# Patient Record
Sex: FEMALE | Race: WHITE
[De-identification: names, ages, dates, MRNs, and addresses within clinical notes are randomized per-mention and may not be internally consistent; named-entity substitution may affect disease eponyms.]

---

## 2019-01-01 ENCOUNTER — RESULT REVIEW (OUTPATIENT)
Age: 59
End: 2019-01-01

## 2019-01-02 ENCOUNTER — APPOINTMENT (OUTPATIENT)
Dept: OBGYN | Facility: CLINIC | Age: 59
End: 2019-01-02
Payer: COMMERCIAL

## 2019-01-02 PROCEDURE — 99396 PREV VISIT EST AGE 40-64: CPT

## 2020-02-11 ENCOUNTER — FORM ENCOUNTER (OUTPATIENT)
Age: 60
End: 2020-02-11

## 2020-02-12 ENCOUNTER — APPOINTMENT (OUTPATIENT)
Dept: OBGYN | Facility: CLINIC | Age: 60
End: 2020-02-12
Payer: COMMERCIAL

## 2020-02-12 PROCEDURE — 99396 PREV VISIT EST AGE 40-64: CPT

## 2021-01-20 ENCOUNTER — FORM ENCOUNTER (OUTPATIENT)
Age: 61
End: 2021-01-20

## 2021-02-22 ENCOUNTER — APPOINTMENT (OUTPATIENT)
Dept: OBGYN | Facility: CLINIC | Age: 61
End: 2021-02-22

## 2021-03-03 ENCOUNTER — FORM ENCOUNTER (OUTPATIENT)
Age: 61
End: 2021-03-03

## 2021-04-05 ENCOUNTER — APPOINTMENT (OUTPATIENT)
Dept: OBGYN | Facility: CLINIC | Age: 61
End: 2021-04-05

## 2023-01-25 ENCOUNTER — APPOINTMENT (OUTPATIENT)
Dept: OBGYN | Facility: CLINIC | Age: 63
End: 2023-01-25
Payer: COMMERCIAL

## 2023-01-25 VITALS
WEIGHT: 183 LBS | SYSTOLIC BLOOD PRESSURE: 132 MMHG | HEIGHT: 65.5 IN | DIASTOLIC BLOOD PRESSURE: 80 MMHG | BODY MASS INDEX: 30.12 KG/M2

## 2023-01-25 DIAGNOSIS — Z01.419 ENCOUNTER FOR GYNECOLOGICAL EXAMINATION (GENERAL) (ROUTINE) W/OUT ABNORMAL FINDINGS: ICD-10-CM

## 2023-01-25 PROCEDURE — 99396 PREV VISIT EST AGE 40-64: CPT

## 2023-01-25 NOTE — PLAN
[FreeTextEntry1] : 63 yo  LMP 2013, annual exam. \par \par HCM\par -pap done today\par -vit D/exercise/BMD f/u pcp for osteo screening\par -rx given for breast mammo/sono (nyu langone)\par -colon screening reviewed, cologuard utd. \par -f/u PCP for annual and appropriate immunizations\par -rto 1 year for annual. \par \par

## 2023-01-25 NOTE — END OF VISIT
[FreeTextEntry3] : I, Arin Renner, acted as a scribe on behalf of Dr. Cecilia Stanford on 01/25/2023. \par \par All medical entries made by the scribe where at my, Dr. Cecilia Stanford, direction and personally dictated by me on 01/25/2023. I have reviewed the chart and agree that the record accurately reflects my personal performance of the history, physical exam, assessment, and plan. I have also personally directed, reviewed and agreed with the chart.\par

## 2023-01-25 NOTE — HISTORY OF PRESENT ILLNESS
[FreeTextEntry1] : 63 yo  LMP , presents for annual exam. She requests a rx for mammo. Pt would like to lose weight, is looking for a new diet. She reports that she had Cologuard done 2 years ago. Pt no longer follows with breast surgeon. \par \par OBHx: c/s x2\par PMHx: breast CA  s/p lumpectomy, tamoxifen x5 years (finished oct 2011) \par PSHx: rt lumpectomy \par NKDA\par  [Mammogramdate] : 3/2021 [TextBox_19] : BIRADS 2 [BreastSonogramDate] : 3/2021 [TextBox_25] : BIRADS 2

## 2023-01-26 LAB — HPV HIGH+LOW RISK DNA PNL CVX: NOT DETECTED

## 2023-01-30 LAB — CYTOLOGY CVX/VAG DOC THIN PREP: NORMAL

## 2023-02-20 ENCOUNTER — OFFICE (OUTPATIENT)
Dept: URBAN - METROPOLITAN AREA CLINIC 12 | Facility: CLINIC | Age: 63
Setting detail: OPHTHALMOLOGY
End: 2023-02-20
Payer: COMMERCIAL

## 2023-02-20 DIAGNOSIS — H25.13: ICD-10-CM

## 2023-02-20 DIAGNOSIS — H53.002: ICD-10-CM

## 2023-02-20 PROCEDURE — 99204 OFFICE O/P NEW MOD 45 MIN: CPT | Performed by: OPHTHALMOLOGY

## 2023-02-20 ASSESSMENT — REFRACTION_MANIFEST
OD_CYLINDER: -0.75
OS_CYLINDER: -1.25
OS_SPHERE: +4.00
OS_VA1: 20/40+1
OD_VA1: 20/25+3
OD_SPHERE: +1.50
OS_AXIS: 085
OD_AXIS: 090

## 2023-02-20 ASSESSMENT — REFRACTION_AUTOREFRACTION
OS_SPHERE: +4.00
OS_AXIS: 086
OD_CYLINDER: -0.75
OD_AXIS: 091
OS_CYLINDER: -1.25
OD_SPHERE: +1.50

## 2023-02-20 ASSESSMENT — REFRACTION_CURRENTRX
OS_AXIS: 074
OS_SPHERE: +3.50
OS_OVR_VA: 20/
OD_CYLINDER: -0.50
OS_CYLINDER: -1.00
OD_OVR_VA: 20/
OD_SPHERE: +1.25
OS_ADD: +2.25
OD_ADD: +2.25
OD_AXIS: 090
OD_VPRISM_DIRECTION: PROGS
OS_VPRISM_DIRECTION: PROGS

## 2023-02-20 ASSESSMENT — SPHEQUIV_DERIVED
OS_SPHEQUIV: 3.375
OD_SPHEQUIV: 1.125
OD_SPHEQUIV: 1.125
OS_SPHEQUIV: 3.375

## 2023-02-20 ASSESSMENT — TONOMETRY
OS_IOP_MMHG: 13
OD_IOP_MMHG: 13

## 2023-02-20 ASSESSMENT — VISUAL ACUITY
OS_BCVA: 20/20-2
OD_BCVA: 20/50+1

## 2023-02-20 ASSESSMENT — AXIALLENGTH_DERIVED
OD_AL: 23.34
OS_AL: 22.3464
OS_AL: 22.3464
OD_AL: 23.34

## 2023-02-20 ASSESSMENT — KERATOMETRY
OD_K2POWER_DIOPTERS: 43.25
OD_AXISANGLE_DEGREES: 046
OS_AXISANGLE_DEGREES: 147
OD_K1POWER_DIOPTERS: 42.75
OS_K1POWER_DIOPTERS: 43.00
OS_K2POWER_DIOPTERS: 44.00

## 2023-02-20 ASSESSMENT — CONFRONTATIONAL VISUAL FIELD TEST (CVF)
OD_FINDINGS: FULL
OS_FINDINGS: FULL

## 2023-05-10 ENCOUNTER — OFFICE (OUTPATIENT)
Dept: URBAN - METROPOLITAN AREA CLINIC 12 | Facility: CLINIC | Age: 63
Setting detail: OPHTHALMOLOGY
End: 2023-05-10
Payer: MEDICARE

## 2023-05-10 DIAGNOSIS — H25.12: ICD-10-CM

## 2023-05-10 DIAGNOSIS — H25.13: ICD-10-CM

## 2023-05-10 DIAGNOSIS — H53.002: ICD-10-CM

## 2023-05-10 PROCEDURE — 99213 OFFICE O/P EST LOW 20 MIN: CPT | Performed by: OPHTHALMOLOGY

## 2023-05-10 PROCEDURE — 92136 OPHTHALMIC BIOMETRY: CPT | Performed by: OPHTHALMOLOGY

## 2023-05-10 ASSESSMENT — REFRACTION_CURRENTRX
OS_VPRISM_DIRECTION: PROGS
OS_ADD: +2.25
OS_CYLINDER: -1.00
OS_OVR_VA: 20/
OS_AXIS: 074
OD_ADD: +2.25
OD_SPHERE: +1.25
OD_VPRISM_DIRECTION: PROGS
OD_OVR_VA: 20/
OD_AXIS: 090
OS_SPHERE: +3.50
OD_CYLINDER: -0.50

## 2023-05-10 ASSESSMENT — CONFRONTATIONAL VISUAL FIELD TEST (CVF)
OS_FINDINGS: FULL
OD_FINDINGS: FULL

## 2023-05-10 ASSESSMENT — SPHEQUIV_DERIVED
OS_SPHEQUIV: 3.75
OD_SPHEQUIV: 1.125
OD_SPHEQUIV: 1.125
OS_SPHEQUIV: 3.375

## 2023-05-10 ASSESSMENT — AXIALLENGTH_DERIVED
OS_AL: 22.3877
OD_AL: 23.385
OD_AL: 23.385
OS_AL: 22.2569

## 2023-05-10 ASSESSMENT — REFRACTION_AUTOREFRACTION
OS_SPHERE: +4.50
OS_AXIS: 085
OD_SPHERE: +1.50
OD_AXIS: 086
OD_CYLINDER: -0.75
OS_CYLINDER: -1.50

## 2023-05-10 ASSESSMENT — REFRACTION_MANIFEST
OD_VA1: 20/25+3
OD_SPHERE: +1.50
OS_CYLINDER: -1.25
OS_SPHERE: +4.00
OS_AXIS: 085
OS_VA1: 20/40+1
OD_CYLINDER: -0.75
OD_AXIS: 090

## 2023-05-10 ASSESSMENT — KERATOMETRY
OD_AXISANGLE_DEGREES: 038
OS_AXISANGLE_DEGREES: 162
OD_K1POWER_DIOPTERS: 42.75
OD_K2POWER_DIOPTERS: 43.00
OS_K2POWER_DIOPTERS: 44.00
OS_K1POWER_DIOPTERS: 42.75

## 2023-05-10 ASSESSMENT — VISUAL ACUITY
OD_BCVA: 20/40+2
OS_BCVA: 20/25

## 2023-05-10 ASSESSMENT — TONOMETRY
OS_IOP_MMHG: 15
OD_IOP_MMHG: 15

## 2023-05-23 ENCOUNTER — ASC (OUTPATIENT)
Dept: URBAN - METROPOLITAN AREA SURGERY 8 | Facility: SURGERY | Age: 63
Setting detail: OPHTHALMOLOGY
End: 2023-05-23
Payer: MEDICARE

## 2023-05-23 DIAGNOSIS — H52.212: ICD-10-CM

## 2023-05-23 DIAGNOSIS — H25.12: ICD-10-CM

## 2023-05-23 PROCEDURE — V2787 ASTIGMATISM-CORRECT FUNCTION: HCPCS | Performed by: OPHTHALMOLOGY

## 2023-05-23 PROCEDURE — A9270 NON-COVERED ITEM OR SERVICE: HCPCS | Performed by: OPHTHALMOLOGY

## 2023-05-23 PROCEDURE — 66984 XCAPSL CTRC RMVL W/O ECP: CPT | Performed by: OPHTHALMOLOGY

## 2023-05-23 PROCEDURE — FEMTO FEMTOSECOND LASER: Performed by: OPHTHALMOLOGY

## 2023-05-24 ENCOUNTER — RX ONLY (RX ONLY)
Age: 63
End: 2023-05-24

## 2023-05-24 ENCOUNTER — OFFICE (OUTPATIENT)
Dept: URBAN - METROPOLITAN AREA CLINIC 12 | Facility: CLINIC | Age: 63
Setting detail: OPHTHALMOLOGY
End: 2023-05-24
Payer: MEDICARE

## 2023-05-24 DIAGNOSIS — H25.11: ICD-10-CM

## 2023-05-24 PROCEDURE — 92136 OPHTHALMIC BIOMETRY: CPT | Performed by: OPHTHALMOLOGY

## 2023-05-24 ASSESSMENT — REFRACTION_CURRENTRX
OD_ADD: +2.25
OD_SPHERE: +1.25
OS_SPHERE: +3.50
OD_OVR_VA: 20/
OS_VPRISM_DIRECTION: PROGS
OD_AXIS: 090
OS_OVR_VA: 20/
OD_VPRISM_DIRECTION: PROGS
OD_CYLINDER: -0.50
OS_AXIS: 074
OS_ADD: +2.25
OS_CYLINDER: -1.00

## 2023-05-24 ASSESSMENT — AXIALLENGTH_DERIVED
OS_AL: 22.4291
OD_AL: 23.5267
OS_AL: 23.5378
OD_AL: 23.4302

## 2023-05-24 ASSESSMENT — SPHEQUIV_DERIVED
OS_SPHEQUIV: 0.375
OS_SPHEQUIV: 3.375
OD_SPHEQUIV: 1.125
OD_SPHEQUIV: 0.875

## 2023-05-24 ASSESSMENT — CONFRONTATIONAL VISUAL FIELD TEST (CVF)
OD_FINDINGS: FULL
OS_FINDINGS: FULL

## 2023-05-24 ASSESSMENT — KERATOMETRY
OD_K2POWER_DIOPTERS: 43.00
OD_AXISANGLE_DEGREES: 043
OS_K2POWER_DIOPTERS: 43.50
OS_K1POWER_DIOPTERS: 43.00
OD_K1POWER_DIOPTERS: 42.50
OS_AXISANGLE_DEGREES: 008

## 2023-05-24 ASSESSMENT — REFRACTION_AUTOREFRACTION
OS_AXIS: 131
OD_CYLINDER: -0.75
OD_SPHERE: +1.25
OD_AXIS: 087
OS_SPHERE: +0.50
OS_CYLINDER: -0.25

## 2023-05-24 ASSESSMENT — REFRACTION_MANIFEST
OD_VA1: 20/25+3
OD_CYLINDER: -0.75
OD_AXIS: 090
OS_SPHERE: +4.00
OS_CYLINDER: -1.25
OD_SPHERE: +1.50
OS_AXIS: 085
OS_VA1: 20/40+1

## 2023-05-24 ASSESSMENT — TONOMETRY
OD_IOP_MMHG: 18
OS_IOP_MMHG: 14

## 2023-05-24 ASSESSMENT — VISUAL ACUITY
OS_BCVA: 20/30-2
OD_BCVA: 20/30-2

## 2023-05-30 ENCOUNTER — ASC (OUTPATIENT)
Dept: URBAN - METROPOLITAN AREA SURGERY 8 | Facility: SURGERY | Age: 63
Setting detail: OPHTHALMOLOGY
End: 2023-05-30
Payer: MEDICARE

## 2023-05-30 DIAGNOSIS — H25.11: ICD-10-CM

## 2023-05-30 DIAGNOSIS — H52.211: ICD-10-CM

## 2023-05-30 PROCEDURE — FEMTO FEMTOSECOND LASER: Performed by: OPHTHALMOLOGY

## 2023-05-30 PROCEDURE — A9270 NON-COVERED ITEM OR SERVICE: HCPCS | Performed by: OPHTHALMOLOGY

## 2023-05-30 PROCEDURE — 66984 XCAPSL CTRC RMVL W/O ECP: CPT | Performed by: OPHTHALMOLOGY

## 2023-05-30 PROCEDURE — V2787 ASTIGMATISM-CORRECT FUNCTION: HCPCS | Performed by: OPHTHALMOLOGY

## 2023-05-31 ENCOUNTER — OFFICE (OUTPATIENT)
Dept: URBAN - METROPOLITAN AREA CLINIC 12 | Facility: CLINIC | Age: 63
Setting detail: OPHTHALMOLOGY
End: 2023-05-31
Payer: MEDICARE

## 2023-05-31 DIAGNOSIS — Z96.1: ICD-10-CM

## 2023-05-31 PROCEDURE — 99024 POSTOP FOLLOW-UP VISIT: CPT | Performed by: OPHTHALMOLOGY

## 2023-05-31 ASSESSMENT — REFRACTION_CURRENTRX
OS_SPHERE: +3.50
OD_AXIS: 090
OD_OVR_VA: 20/
OD_VPRISM_DIRECTION: PROGS
OD_ADD: +2.25
OS_AXIS: 074
OS_CYLINDER: -1.00
OS_ADD: +2.25
OS_OVR_VA: 20/
OD_SPHERE: +1.25
OS_VPRISM_DIRECTION: PROGS
OD_CYLINDER: -0.50

## 2023-05-31 ASSESSMENT — VISUAL ACUITY
OD_BCVA: 20/30-2
OS_BCVA: 20/50+2

## 2023-05-31 ASSESSMENT — REFRACTION_MANIFEST
OD_VA1: 20/25+3
OS_SPHERE: +4.00
OS_AXIS: 085
OD_AXIS: 090
OD_CYLINDER: -0.75
OS_VA1: 20/40+1
OD_SPHERE: +1.50
OS_CYLINDER: -1.25

## 2023-05-31 ASSESSMENT — REFRACTION_AUTOREFRACTION
OD_AXIS: 125
OS_AXIS: 080
OS_CYLINDER: -0.50
OD_SPHERE: PLANO
OS_SPHERE: +0.25
OD_CYLINDER: -0.50

## 2023-05-31 ASSESSMENT — TONOMETRY
OD_IOP_MMHG: 14
OS_IOP_MMHG: 12

## 2023-05-31 ASSESSMENT — SPHEQUIV_DERIVED
OS_SPHEQUIV: 3.375
OS_SPHEQUIV: 0
OD_SPHEQUIV: 1.125

## 2023-05-31 ASSESSMENT — CONFRONTATIONAL VISUAL FIELD TEST (CVF)
OS_FINDINGS: FULL
OD_FINDINGS: FULL

## 2023-06-07 ENCOUNTER — OFFICE (OUTPATIENT)
Dept: URBAN - METROPOLITAN AREA CLINIC 12 | Facility: CLINIC | Age: 63
Setting detail: OPHTHALMOLOGY
End: 2023-06-07
Payer: MEDICARE

## 2023-06-07 DIAGNOSIS — Z96.1: ICD-10-CM

## 2023-06-07 PROCEDURE — 99024 POSTOP FOLLOW-UP VISIT: CPT | Performed by: OPHTHALMOLOGY

## 2023-06-07 ASSESSMENT — REFRACTION_MANIFEST
OD_AXIS: 090
OS_VA1: 20/40+1
OD_VA1: 20/25+3
OS_CYLINDER: -1.25
OS_AXIS: 085
OD_SPHERE: +1.50
OD_CYLINDER: -0.75
OS_SPHERE: +4.00

## 2023-06-07 ASSESSMENT — REFRACTION_AUTOREFRACTION
OD_AXIS: 112
OD_CYLINDER: -0.75
OS_SPHERE: +0.50
OS_CYLINDER: -0.50
OD_SPHERE: +0.50
OS_AXIS: 060

## 2023-06-07 ASSESSMENT — REFRACTION_CURRENTRX
OD_SPHERE: +1.25
OS_ADD: +2.25
OD_AXIS: 090
OD_ADD: +2.25
OS_VPRISM_DIRECTION: PROGS
OD_CYLINDER: -0.50
OS_CYLINDER: -1.00
OS_OVR_VA: 20/
OS_AXIS: 074
OD_OVR_VA: 20/
OD_VPRISM_DIRECTION: PROGS
OS_SPHERE: +3.50

## 2023-06-07 ASSESSMENT — SPHEQUIV_DERIVED
OD_SPHEQUIV: 1.125
OS_SPHEQUIV: 0.25
OS_SPHEQUIV: 3.375
OD_SPHEQUIV: 0.125

## 2023-06-07 ASSESSMENT — CONFRONTATIONAL VISUAL FIELD TEST (CVF)
OS_FINDINGS: FULL
OD_FINDINGS: FULL

## 2023-06-07 ASSESSMENT — AXIALLENGTH_DERIVED
OD_AL: 23.5891
OS_AL: 22.3877
OD_AL: 23.2059
OS_AL: 23.5405

## 2023-06-07 ASSESSMENT — VISUAL ACUITY
OS_BCVA: 20/20-2
OD_BCVA: 20/25-1

## 2023-06-07 ASSESSMENT — KERATOMETRY
OS_K1POWER_DIOPTERS: 43.00
OS_AXISANGLE_DEGREES: 150
OS_K2POWER_DIOPTERS: 43.75
OD_K1POWER_DIOPTERS: 43.00
OD_K2POWER_DIOPTERS: 43.75
OD_AXISANGLE_DEGREES: 069

## 2023-06-07 ASSESSMENT — TONOMETRY
OS_IOP_MMHG: 13
OD_IOP_MMHG: 10

## 2023-06-28 ENCOUNTER — OFFICE (OUTPATIENT)
Dept: URBAN - METROPOLITAN AREA CLINIC 12 | Facility: CLINIC | Age: 63
Setting detail: OPHTHALMOLOGY
End: 2023-06-28
Payer: MEDICARE

## 2023-06-28 DIAGNOSIS — Z96.1: ICD-10-CM

## 2023-06-28 PROCEDURE — 99024 POSTOP FOLLOW-UP VISIT: CPT | Performed by: OPHTHALMOLOGY

## 2023-06-28 ASSESSMENT — REFRACTION_CURRENTRX
OS_VPRISM_DIRECTION: PROGS
OS_ADD: +2.25
OD_ADD: +2.25
OS_CYLINDER: -1.00
OS_OVR_VA: 20/
OD_AXIS: 090
OS_SPHERE: +3.50
OD_CYLINDER: -0.50
OS_AXIS: 074
OD_OVR_VA: 20/
OD_VPRISM_DIRECTION: PROGS
OD_SPHERE: +1.25

## 2023-06-28 ASSESSMENT — REFRACTION_AUTOREFRACTION
OS_AXIS: 177
OS_CYLINDER: -1.00
OD_SPHERE: PLANO
OD_AXIS: 107
OS_SPHERE: -0.50
OD_CYLINDER: -0.25

## 2023-06-28 ASSESSMENT — REFRACTION_MANIFEST
OD_SPHERE: +1.50
OD_CYLINDER: -0.75
OS_CYLINDER: -1.25
OS_VA1: 20/40+1
OS_SPHERE: +4.00
OD_VA1: 20/25+3
OS_AXIS: 085
OD_AXIS: 090

## 2023-06-28 ASSESSMENT — SPHEQUIV_DERIVED
OD_SPHEQUIV: 1.125
OS_SPHEQUIV: -1
OS_SPHEQUIV: 3.375

## 2023-06-28 ASSESSMENT — AXIALLENGTH_DERIVED
OD_AL: 23.2504
OS_AL: 24.0834
OS_AL: 22.4291

## 2023-06-28 ASSESSMENT — KERATOMETRY
OS_K1POWER_DIOPTERS: 42.75
OD_K1POWER_DIOPTERS: 43.00
OS_K2POWER_DIOPTERS: 43.75
OS_AXISANGLE_DEGREES: 147
OD_K2POWER_DIOPTERS: 43.50
OD_AXISANGLE_DEGREES: 54

## 2023-06-28 ASSESSMENT — TONOMETRY
OS_IOP_MMHG: 16
OD_IOP_MMHG: 15

## 2023-06-28 ASSESSMENT — CONFRONTATIONAL VISUAL FIELD TEST (CVF)
OD_FINDINGS: FULL
OS_FINDINGS: FULL

## 2023-06-28 ASSESSMENT — VISUAL ACUITY
OD_BCVA: 20/40
OS_BCVA: 20/20

## 2023-07-12 ENCOUNTER — RX ONLY (RX ONLY)
Age: 63
End: 2023-07-12

## 2023-07-12 ENCOUNTER — OFFICE (OUTPATIENT)
Dept: URBAN - METROPOLITAN AREA CLINIC 12 | Facility: CLINIC | Age: 63
Setting detail: OPHTHALMOLOGY
End: 2023-07-12
Payer: MEDICARE

## 2023-07-12 DIAGNOSIS — H26.493: ICD-10-CM

## 2023-07-12 DIAGNOSIS — H16.223: ICD-10-CM

## 2023-07-12 DIAGNOSIS — Z96.1: ICD-10-CM

## 2023-07-12 PROCEDURE — 99024 POSTOP FOLLOW-UP VISIT: CPT | Performed by: OPHTHALMOLOGY

## 2023-07-12 ASSESSMENT — REFRACTION_CURRENTRX
OD_AXIS: 090
OD_ADD: +2.25
OD_SPHERE: +1.25
OD_OVR_VA: 20/
OD_VPRISM_DIRECTION: PROGS
OD_CYLINDER: -0.50
OS_AXIS: 074
OS_OVR_VA: 20/
OS_VPRISM_DIRECTION: PROGS
OS_ADD: +2.25
OS_SPHERE: +3.50
OS_CYLINDER: -1.00

## 2023-07-12 ASSESSMENT — REFRACTION_AUTOREFRACTION
OD_AXIS: 90
OS_CYLINDER: -0.75
OS_AXIS: 168
OD_CYLINDER: -0.25
OS_SPHERE: -1.25
OD_SPHERE: +0.25

## 2023-07-12 ASSESSMENT — REFRACTION_MANIFEST
OS_ADD: +1.75
OS_CYLINDER: -0.50
OD_SPHERE: PLANO
OS_VA1: 20/40+1
OD_VA1: 20/25+3
OD_AXIS: 000
OD_CYLINDER: SPHERE
OS_SPHERE: -1.00
OS_AXIS: 170
OD_ADD: +1.75

## 2023-07-12 ASSESSMENT — AXIALLENGTH_DERIVED
OD_AL: 23.7742
OS_AL: 24.1941
OS_AL: 24.0414

## 2023-07-12 ASSESSMENT — KERATOMETRY
OD_K2POWER_DIOPTERS: 43.00
OD_AXISANGLE_DEGREES: 41
OS_AXISANGLE_DEGREES: 134
METHOD_AUTO_MANUAL: AUTO
OS_K1POWER_DIOPTERS: 43.25
OD_K1POWER_DIOPTERS: 42.75
OS_K2POWER_DIOPTERS: 44.00

## 2023-07-12 ASSESSMENT — CONFRONTATIONAL VISUAL FIELD TEST (CVF)
OS_FINDINGS: FULL
OD_FINDINGS: FULL

## 2023-07-12 ASSESSMENT — SPHEQUIV_DERIVED
OS_SPHEQUIV: -1.25
OD_SPHEQUIV: 0.125
OS_SPHEQUIV: -1.625

## 2023-07-12 ASSESSMENT — TONOMETRY
OS_IOP_MMHG: 11
OD_IOP_MMHG: 11

## 2023-07-12 ASSESSMENT — SUPERFICIAL PUNCTATE KERATITIS (SPK)
OS_SPK: 1+
OD_SPK: 1+

## 2023-07-12 ASSESSMENT — VISUAL ACUITY
OS_BCVA: 20/20
OD_BCVA: 20/50

## 2023-10-18 ENCOUNTER — OFFICE (OUTPATIENT)
Dept: URBAN - METROPOLITAN AREA CLINIC 12 | Facility: CLINIC | Age: 63
Setting detail: OPHTHALMOLOGY
End: 2023-10-18
Payer: MEDICARE

## 2023-10-18 DIAGNOSIS — H26.493: ICD-10-CM

## 2023-10-18 DIAGNOSIS — H16.223: ICD-10-CM

## 2023-10-18 DIAGNOSIS — H53.002: ICD-10-CM

## 2023-10-18 DIAGNOSIS — Z96.1: ICD-10-CM

## 2023-10-18 PROCEDURE — 99214 OFFICE O/P EST MOD 30 MIN: CPT | Performed by: OPHTHALMOLOGY

## 2023-10-18 ASSESSMENT — REFRACTION_MANIFEST
OS_ADD: +1.75
OD_AXIS: 096
OS_CYLINDER: -0.75
OS_AXIS: 170
OD_CYLINDER: SPHERE
OS_CYLINDER: -0.50
OD_SPHERE: PLANO
OS_VA1: 20/20-
OS_VA1: 20/40+1
OD_VA1: 20/20
OS_AXIS: 163
OD_VA1: 20/25+3
OD_CYLINDER: -0.25
OD_ADD: +1.75
OS_SPHERE: -1.25
OD_SPHERE: PLANO
OD_AXIS: 000
OS_SPHERE: -1.00

## 2023-10-18 ASSESSMENT — VISUAL ACUITY
OS_BCVA: 20/20
OD_BCVA: 20/50

## 2023-10-18 ASSESSMENT — REFRACTION_CURRENTRX
OD_SPHERE: +1.25
OS_VPRISM_DIRECTION: PROGS
OS_SPHERE: +3.50
OD_CYLINDER: -0.50
OD_VPRISM_DIRECTION: PROGS
OS_CYLINDER: -1.00
OS_OVR_VA: 20/
OD_OVR_VA: 20/
OS_ADD: +2.25
OD_ADD: +2.25
OD_AXIS: 090
OS_AXIS: 074

## 2023-10-18 ASSESSMENT — KERATOMETRY
OS_K1POWER_DIOPTERS: 43.00
OD_AXISANGLE_DEGREES: 046
OS_K2POWER_DIOPTERS: 44.00
OD_K2POWER_DIOPTERS: 43.00
METHOD_AUTO_MANUAL: AUTO
OS_AXISANGLE_DEGREES: 162
OD_K1POWER_DIOPTERS: 42.50

## 2023-10-18 ASSESSMENT — SPHEQUIV_DERIVED
OS_SPHEQUIV: -1.25
OS_SPHEQUIV: -1.625
OS_SPHEQUIV: -1.625

## 2023-10-18 ASSESSMENT — REFRACTION_AUTOREFRACTION
OD_CYLINDER: -0.25
OS_SPHERE: -1.25
OS_CYLINDER: -0.75
OD_AXIS: 096
OS_AXIS: 163
OD_SPHERE: PLANO

## 2023-10-18 ASSESSMENT — SUPERFICIAL PUNCTATE KERATITIS (SPK)
OD_SPK: 1+
OS_SPK: 1+

## 2023-10-18 ASSESSMENT — TONOMETRY
OD_IOP_MMHG: 13
OS_IOP_MMHG: 12

## 2023-10-18 ASSESSMENT — CONFRONTATIONAL VISUAL FIELD TEST (CVF)
OS_FINDINGS: FULL
OD_FINDINGS: FULL

## 2023-10-18 ASSESSMENT — AXIALLENGTH_DERIVED
OS_AL: 24.2425
OS_AL: 24.0892
OS_AL: 24.2425

## 2023-12-01 ENCOUNTER — ASC (OUTPATIENT)
Dept: URBAN - METROPOLITAN AREA SURGERY 8 | Facility: SURGERY | Age: 63
Setting detail: OPHTHALMOLOGY
End: 2023-12-01
Payer: MEDICARE

## 2023-12-01 DIAGNOSIS — H26.492: ICD-10-CM

## 2023-12-01 PROCEDURE — 66821 AFTER CATARACT LASER SURGERY: CPT | Mod: LT | Performed by: OPHTHALMOLOGY

## 2023-12-01 ASSESSMENT — REFRACTION_MANIFEST
OS_ADD: +1.75
OS_AXIS: 163
OS_VA1: 20/40+1
OS_AXIS: 170
OS_VA1: 20/20-
OD_VA1: 20/20
OS_CYLINDER: -0.50
OS_CYLINDER: -0.75
OD_VA1: 20/25+3
OD_CYLINDER: -0.25
OS_SPHERE: -1.00
OD_AXIS: 096
OD_SPHERE: PLANO
OD_ADD: +1.75
OD_CYLINDER: SPHERE
OD_AXIS: 000
OS_SPHERE: -1.25
OD_SPHERE: PLANO

## 2023-12-01 ASSESSMENT — REFRACTION_CURRENTRX
OS_ADD: +2.25
OS_CYLINDER: -1.00
OS_AXIS: 074
OD_OVR_VA: 20/
OS_SPHERE: +3.50
OS_VPRISM_DIRECTION: PROGS
OS_OVR_VA: 20/
OD_SPHERE: +1.25
OD_ADD: +2.25
OD_AXIS: 090
OD_CYLINDER: -0.50
OD_VPRISM_DIRECTION: PROGS

## 2023-12-01 ASSESSMENT — REFRACTION_AUTOREFRACTION
OD_CYLINDER: -0.25
OS_SPHERE: -1.25
OS_CYLINDER: -0.75
OD_SPHERE: PLANO
OS_AXIS: 163
OD_AXIS: 096

## 2023-12-01 ASSESSMENT — SUPERFICIAL PUNCTATE KERATITIS (SPK)
OD_SPK: 1+
OS_SPK: 1+

## 2023-12-01 ASSESSMENT — SPHEQUIV_DERIVED
OS_SPHEQUIV: -1.625
OS_SPHEQUIV: -1.625
OS_SPHEQUIV: -1.25

## 2023-12-01 ASSESSMENT — CONFRONTATIONAL VISUAL FIELD TEST (CVF)
OS_FINDINGS: FULL
OD_FINDINGS: FULL

## 2023-12-15 ENCOUNTER — ASC (OUTPATIENT)
Dept: URBAN - METROPOLITAN AREA SURGERY 8 | Facility: SURGERY | Age: 63
Setting detail: OPHTHALMOLOGY
End: 2023-12-15
Payer: MEDICARE

## 2023-12-15 ENCOUNTER — RX ONLY (RX ONLY)
Age: 63
End: 2023-12-15

## 2023-12-15 DIAGNOSIS — H26.491: ICD-10-CM

## 2023-12-15 PROCEDURE — 66821 AFTER CATARACT LASER SURGERY: CPT | Mod: 79,RT | Performed by: OPHTHALMOLOGY

## 2023-12-15 ASSESSMENT — REFRACTION_MANIFEST
OD_CYLINDER: -0.25
OS_VA1: 20/20-
OD_ADD: +1.75
OD_VA1: 20/20
OS_ADD: +1.75
OD_CYLINDER: SPHERE
OS_SPHERE: -1.00
OS_SPHERE: -1.25
OD_VA1: 20/25+3
OD_SPHERE: PLANO
OS_AXIS: 170
OS_AXIS: 163
OD_AXIS: 096
OD_AXIS: 000
OS_CYLINDER: -0.50
OS_VA1: 20/40+1
OS_CYLINDER: -0.75
OD_SPHERE: PLANO

## 2023-12-15 ASSESSMENT — REFRACTION_AUTOREFRACTION
OS_CYLINDER: -0.75
OS_AXIS: 163
OS_SPHERE: -1.25
OD_SPHERE: PLANO
OD_CYLINDER: -0.25
OD_AXIS: 096

## 2023-12-15 ASSESSMENT — REFRACTION_CURRENTRX
OD_OVR_VA: 20/
OS_SPHERE: +3.50
OS_OVR_VA: 20/
OS_VPRISM_DIRECTION: PROGS
OD_SPHERE: +1.25
OD_AXIS: 090
OS_AXIS: 074
OS_CYLINDER: -1.00
OD_VPRISM_DIRECTION: PROGS
OD_ADD: +2.25
OD_CYLINDER: -0.50
OS_ADD: +2.25

## 2023-12-15 ASSESSMENT — CONFRONTATIONAL VISUAL FIELD TEST (CVF)
OD_FINDINGS: FULL
OS_FINDINGS: FULL

## 2023-12-15 ASSESSMENT — SUPERFICIAL PUNCTATE KERATITIS (SPK)
OS_SPK: 1+
OD_SPK: 1+

## 2023-12-15 ASSESSMENT — SPHEQUIV_DERIVED
OS_SPHEQUIV: -1.25
OS_SPHEQUIV: -1.625
OS_SPHEQUIV: -1.625

## 2024-01-10 ENCOUNTER — OFFICE (OUTPATIENT)
Dept: URBAN - METROPOLITAN AREA CLINIC 12 | Facility: CLINIC | Age: 64
Setting detail: OPHTHALMOLOGY
End: 2024-01-10
Payer: MEDICARE

## 2024-01-10 DIAGNOSIS — H26.493: ICD-10-CM

## 2024-01-10 PROCEDURE — 99024 POSTOP FOLLOW-UP VISIT: CPT | Performed by: OPTOMETRIST

## 2024-01-10 ASSESSMENT — REFRACTION_CURRENTRX
OS_ADD: +2.25
OS_SPHERE: +3.50
OD_AXIS: 090
OS_OVR_VA: 20/
OD_ADD: +2.25
OS_AXIS: 074
OD_CYLINDER: -0.50
OD_SPHERE: +1.25
OD_OVR_VA: 20/
OD_VPRISM_DIRECTION: PROGS
OS_CYLINDER: -1.00
OS_VPRISM_DIRECTION: PROGS

## 2024-01-10 ASSESSMENT — SUPERFICIAL PUNCTATE KERATITIS (SPK)
OS_SPK: 1+
OD_SPK: 1+

## 2024-01-10 ASSESSMENT — REFRACTION_MANIFEST
OD_CYLINDER: -0.25
OD_VA1: 20/25+3
OS_VA1: 20/40+1
OD_AXIS: 000
OS_AXIS: 163
OS_SPHERE: -1.00
OD_SPHERE: PLANO
OD_AXIS: 096
OS_SPHERE: -1.25
OS_ADD: +1.75
OS_CYLINDER: -0.50
OD_SPHERE: PLANO
OD_ADD: +1.75
OD_VA1: 20/20
OS_VA1: 20/20-
OS_AXIS: 170
OD_CYLINDER: SPHERE
OS_CYLINDER: -0.75

## 2024-01-10 ASSESSMENT — REFRACTION_AUTOREFRACTION
OS_SPHERE: -1.25
OD_AXIS: 104
OD_CYLINDER: -0.25
OD_SPHERE: PLANO
OS_AXIS: 156
OS_CYLINDER: -0.75

## 2024-01-10 ASSESSMENT — CONFRONTATIONAL VISUAL FIELD TEST (CVF)
OS_FINDINGS: FULL
OD_FINDINGS: FULL

## 2024-01-10 ASSESSMENT — SPHEQUIV_DERIVED
OS_SPHEQUIV: -1.625
OS_SPHEQUIV: -1.625
OS_SPHEQUIV: -1.25

## 2024-07-08 ENCOUNTER — APPOINTMENT (OUTPATIENT)
Dept: OBGYN | Facility: CLINIC | Age: 64
End: 2024-07-08

## 2024-07-17 ENCOUNTER — OFFICE (OUTPATIENT)
Dept: URBAN - METROPOLITAN AREA CLINIC 12 | Facility: CLINIC | Age: 64
Setting detail: OPHTHALMOLOGY
End: 2024-07-17
Payer: MEDICARE

## 2024-07-17 DIAGNOSIS — H16.223: ICD-10-CM

## 2024-07-17 DIAGNOSIS — H26.493: ICD-10-CM

## 2024-07-17 PROCEDURE — 92014 COMPRE OPH EXAM EST PT 1/>: CPT | Performed by: OPTOMETRIST

## 2024-07-17 ASSESSMENT — CONFRONTATIONAL VISUAL FIELD TEST (CVF)
OD_FINDINGS: FULL
OS_FINDINGS: FULL

## 2025-01-15 ENCOUNTER — OFFICE (OUTPATIENT)
Dept: URBAN - METROPOLITAN AREA CLINIC 12 | Facility: CLINIC | Age: 65
Setting detail: OPHTHALMOLOGY
End: 2025-01-15
Payer: MEDICARE

## 2025-01-15 DIAGNOSIS — H26.493: ICD-10-CM

## 2025-01-15 DIAGNOSIS — H16.223: ICD-10-CM

## 2025-01-15 DIAGNOSIS — Z96.1: ICD-10-CM

## 2025-01-15 DIAGNOSIS — H53.002: ICD-10-CM

## 2025-01-15 PROBLEM — H52.7 REFRACTIVE ERROR: Status: ACTIVE | Noted: 2025-01-15

## 2025-01-15 PROCEDURE — 92014 COMPRE OPH EXAM EST PT 1/>: CPT | Performed by: OPHTHALMOLOGY

## 2025-01-15 ASSESSMENT — REFRACTION_MANIFEST
OS_ADD: +1.75
OD_CYLINDER: SPHERE
OD_SPHERE: PLANO
OS_CYLINDER: -0.50
OD_ADD: +1.75
OD_SPHERE: PLANO
OD_VA1: 20/20-
OS_AXIS: 170
OS_CYLINDER: -0.50
OS_VA1: 20/40+1
OS_AXIS: 170
OS_VA1: 20/20-
OS_SPHERE: -1.00
OD_ADD: +2.25
OD_AXIS: 000
OD_CYLINDER: SPH
OS_ADD: +2.25
OS_SPHERE: -1.25
OD_VA1: 20/25+3

## 2025-01-15 ASSESSMENT — REFRACTION_CURRENTRX
OS_VPRISM_DIRECTION: SV
OD_VPRISM_DIRECTION: SV
OS_SPHERE: +0.75
OD_SPHERE: +1.75
OD_OVR_VA: 20/
OS_CYLINDER: -0.50
OS_AXIS: 168
OD_CYLINDER: SPH
OS_OVR_VA: 20/

## 2025-01-15 ASSESSMENT — VISUAL ACUITY
OD_BCVA: 20/40
OS_BCVA: 20/20-1

## 2025-01-15 ASSESSMENT — KERATOMETRY
OD_AXISANGLE_DEGREES: 028
METHOD_AUTO_MANUAL: AUTO
OS_K2POWER_DIOPTERS: 43.50
OS_K1POWER_DIOPTERS: 42.75
OS_AXISANGLE_DEGREES: 152
OD_K1POWER_DIOPTERS: 42.50
OD_K2POWER_DIOPTERS: 43.00

## 2025-01-15 ASSESSMENT — REFRACTION_AUTOREFRACTION
OD_SPHERE: PLANO
OS_SPHERE: -1.25
OD_CYLINDER: -0.25
OD_AXIS: 103
OS_AXIS: 154
OS_CYLINDER: -1.00

## 2025-01-15 ASSESSMENT — TONOMETRY
OS_IOP_MMHG: 16
OD_IOP_MMHG: 16

## 2025-01-15 ASSESSMENT — CONFRONTATIONAL VISUAL FIELD TEST (CVF)
OD_FINDINGS: FULL
OS_FINDINGS: FULL

## 2025-01-15 ASSESSMENT — SUPERFICIAL PUNCTATE KERATITIS (SPK)
OS_SPK: 1+
OD_SPK: 1+